# Patient Record
Sex: FEMALE | Race: WHITE | Employment: FULL TIME | URBAN - METROPOLITAN AREA
[De-identification: names, ages, dates, MRNs, and addresses within clinical notes are randomized per-mention and may not be internally consistent; named-entity substitution may affect disease eponyms.]

---

## 2022-01-14 ENCOUNTER — OFFICE VISIT (OUTPATIENT)
Dept: ORTHOPEDIC SURGERY | Age: 52
End: 2022-01-14
Payer: COMMERCIAL

## 2022-01-14 VITALS — WEIGHT: 125 LBS | BODY MASS INDEX: 20.83 KG/M2 | HEIGHT: 65 IN

## 2022-01-14 DIAGNOSIS — M77.11 LATERAL EPICONDYLITIS OF RIGHT ELBOW: Primary | ICD-10-CM

## 2022-01-14 PROCEDURE — 99214 OFFICE O/P EST MOD 30 MIN: CPT | Performed by: ORTHOPAEDIC SURGERY

## 2022-01-14 PROCEDURE — 20551 NJX 1 TENDON ORIGIN/INSJ: CPT | Performed by: ORTHOPAEDIC SURGERY

## 2022-01-14 RX ORDER — LIDOCAINE HYDROCHLORIDE 10 MG/ML
1 INJECTION INFILTRATION; PERINEURAL ONCE
Status: COMPLETED | OUTPATIENT
Start: 2022-01-14 | End: 2022-01-14

## 2022-01-14 RX ORDER — TOPIRAMATE 50 MG/1
TABLET, FILM COATED ORAL
COMMUNITY
Start: 2021-10-29

## 2022-01-14 RX ORDER — OMEPRAZOLE 40 MG/1
CAPSULE, DELAYED RELEASE ORAL
COMMUNITY
Start: 2021-11-10

## 2022-01-14 RX ORDER — BUPIVACAINE HYDROCHLORIDE 2.5 MG/ML
1 INJECTION, SOLUTION INFILTRATION; PERINEURAL ONCE
Status: COMPLETED | OUTPATIENT
Start: 2022-01-14 | End: 2022-01-14

## 2022-01-14 RX ORDER — METHYLPREDNISOLONE ACETATE 40 MG/ML
80 INJECTION, SUSPENSION INTRA-ARTICULAR; INTRALESIONAL; INTRAMUSCULAR; SOFT TISSUE ONCE
Status: COMPLETED | OUTPATIENT
Start: 2022-01-14 | End: 2022-01-14

## 2022-01-14 RX ORDER — ESCITALOPRAM OXALATE 10 MG/1
TABLET ORAL
COMMUNITY
Start: 2021-11-15

## 2022-01-14 RX ADMIN — BUPIVACAINE HYDROCHLORIDE 2.5 MG: 2.5 INJECTION, SOLUTION INFILTRATION; PERINEURAL at 10:16

## 2022-01-14 RX ADMIN — LIDOCAINE HYDROCHLORIDE 1 ML: 10 INJECTION INFILTRATION; PERINEURAL at 10:16

## 2022-01-14 RX ADMIN — METHYLPREDNISOLONE ACETATE 80 MG: 40 INJECTION, SUSPENSION INTRA-ARTICULAR; INTRALESIONAL; INTRAMUSCULAR; SOFT TISSUE at 10:16

## 2022-01-14 NOTE — PROGRESS NOTES
ASSESSMENT/PLAN:  Below is the assessment and plan developed based on review of pertinent history, physical exam, labs, studies, and medications. 1. Lateral epicondylitis of right elbow  In discussion with the patient, we considered the numerus possible diagnoses that could be contributing to their present symptoms. We also deliberated on the extensive management options that must be considered to treat their current condition. We reviewed their accessible prior medical records, diagnostic tests, and current health and employment information. We considered how these symptoms were affecting the patient´s activities of daily living as well as employment and fitness activities. The patient had various questions regarding the possible risks, benefits, complications, morbidity and mortality regarding their diagnosis and treatment options. The patients´ comorbidities were considered, and I advocated that they consider maximizing lifestyle modification through nutrition and exercise to aid in addressing their symptoms. Shared decision making yielded an understanding to move forward with conservation treatment preferences. The patient expressed understanding that if conservative management fails to alleviate the present symptoms they will return to office for re-evaluation and consideration of additional diagnostic tests and potential surgical options. In the interim, we have recommned ice, elevation and anti-inflammatory medications along with a physician directed home exercise program. We discussed the risks and common side effects of anti-inflamatory medications and instructed the patient to discontinue the medication and contact us if they experienced any side effects. The patient was encouraged to discuss the possible side effects with their family physician or pharmacist prior to initiating any new medications.      We discussed the possibility of injection of a steroid mixed with a local anesthetic to relieve pain and decrease inflammation of the right elbow. The risks of a steroid injection which include but are not limited to cartilage damage, death of nearby bone, joint infection, nerve damage, temporary facial flushing, temporary steroid flare of pain and inflammation in the joint, temporary increase in blood sugar, tendon weakening or rupture, thinning of nearby bone (osteoporosis), thinning of skin and soft tissue around the injection site, and whitening or lightening of the skin around the injection site were reviewed at length. We specifically advised that patients with diabetes talk to their primary care to ensure they are safe for a steroid injection due to the transient increase in blood sugar associated with the injection. We discussed the chance of increased bleeding and bruising if the patient is on blood thinners or certain dietary supplements that have a blood-thinning effect. We advised patients that have an active infection, history of allergic reactions to steroids or take medications that may prohibit them from receiving a steroid injection to talk to their primary care physician before receiving and injection. We also talked about limiting the number of injections because these potential side effects increase with larger doses and repeated use. After explaining the risks and benefits of the procedure and obtaining verbal informed consent from the patient, the proposed area for injection was confirmed with the patient. After all questions and concerns were addressed, the skin was prepped with alcohol to reduce the chances of infection. The skin was anesthetized with topical ethylene chloride spray and the mixture of two milliliters of Depo-Medrol (40mg/ml), one milliliter of Lidocaine and one milliliter of Marcaine was injected slowly into the lateral epicondyle of right elbow in a sterile fashion without difficulty. The needle was removed and disposed of in a sterile container.  The patient tolerated the injection well and a band-aid was placed on the skin. The patient was counseled on protecting the injection area, avoiding water submersion for 2 days, icing for pain relief and looking for signs and symptoms of infection. We requested that the patient contact us if any symptoms persist greater than 48 hours after the injection. We talked about the fact that multiple cortisone injections certainly has a side effect of skin atrophy. She understands this and is willing to proceed. Return in about 3 months (around 2022). SUBJECTIVE/OBJECTIVE:  Ysabel Ibarra (: 1970) is a 46 y.o. female, patient,here for evaluation of the Elbow Pain (right elbow)  . The patient is seen today for her right elbow. She was last seen on nd underwent a cortisone injection for tennis elbow. She returns today for follow-up. Physical Exam    Upon physical examination, the patient is well developed, well nourished, alert and oriented times three, with normal mood and affect and walks with a normal gait. Upon examination of the right elbow, the patient sits with normal posture. They are tender to palpation over the lateral epicondyle and extensor origin. The patient has full range of motion, but discomfort with resisted wrist extension and gripping. They have 5/5 strength, and are neurovascularly intact distally. There is no erythema, warmth or skin lesions present. On examination of the contralateral extremity, the patient is nontender to palpation and has excellent range of motion, stability and strength. Imaging:    I have reviewed the patient´s previous diagnostic tests in an effort to support the diagnosis and treatment options.     Allergies   Allergen Reactions    Tetracyclines Anaphylaxis       Current Outpatient Medications   Medication Sig    escitalopram oxalate (LEXAPRO) 10 mg tablet     omeprazole (PRILOSEC) 40 mg capsule     topiramate (TOPAMAX) 50 mg tablet No current facility-administered medications for this visit. Past Medical History:   Diagnosis Date    Cancer [facility])        Past Surgical History:   Procedure Laterality Date    HX KNEE ARTHROSCOPY         History reviewed. No pertinent family history. Social History     Socioeconomic History    Marital status:      Spouse name: Not on file    Number of children: Not on file    Years of education: Not on file    Highest education level: Not on file   Occupational History    Not on file   Tobacco Use    Smoking status: Current Every Day Smoker    Smokeless tobacco: Never Used   Vaping Use    Vaping Use: Never used   Substance and Sexual Activity    Alcohol use: Yes    Drug use: Never    Sexual activity: Not on file   Other Topics Concern    Not on file   Social History Narrative    Not on file     Social Determinants of Health     Financial Resource Strain:     Difficulty of Paying Living Expenses: Not on file   Food Insecurity:     Worried About Running Out of Food in the Last Year: Not on file    [place] of Food in the Last Year: Not on file   Transportation Needs:     Lack of Transportation (Medical): Not on file    Lack of Transportation (Non-Medical):  Not on file   Physical Activity:     Days of Exercise per Week: Not on file    Minutes of Exercise per Session: Not on file   Stress:     Feeling of Stress : Not on file   Social Connections:     Frequency of Communication with Friends and Family: Not on file    Frequency of Social Gatherings with Friends and Family: Not on file    Attends Bahai Services: Not on file    Active Member of Clubs or Organizations: Not on file    Attends Club or Organization Meetings: Not on file    Marital Status: Not on file   Intimate Partner Violence:     Fear of Current or Ex-Partner: Not on file    Emotionally Abused: Not on file    Physically Abused: Not on file    Sexually Abused: Not on file   Housing Stability:     Unable to Pay for Housing in the Last Year: Not on file    Number of Places Lived in the Last Year: Not on file    Unstable Housing in the Last Year: Not on file       Review of Systems    No flowsheet data found. Vitals:  Ht 5' 5\" (1.651 m)   Wt 125 lb (56.7 kg)   BMI 20.80 kg/m²    Body mass index is 20.8 kg/m². An electronic signature was used to authenticate this note.   -- Corrina Mock MD

## 2022-04-24 NOTE — PROGRESS NOTES
ASSESSMENT/PLAN:  Below is the assessment and plan developed based on review of pertinent history, physical exam, labs, studies, and medications. 1. Lateral epicondylitis of right elbow  -     RI DRAIN/INJECT INTERMEDIATE JOINT/BURSA      Return in about 3 months (around 7/25/2022). In discussion with the patient, we considered the numerus possible diagnoses that could be contributing to their present symptoms. We also deliberated on the extensive management options that must be considered to treat their current condition. We reviewed their accessible prior medical records, diagnostic tests, and current health and employment information. We considered how these symptoms were affecting the patient´s activities of daily living as well as employment and fitness activities. The patient had various questions regarding the possible risks, benefits, complications, morbidity and mortality regarding their diagnosis and treatment options. The patients´ comorbidities were considered, and I advocated that they consider maximizing lifestyle modification through nutrition and exercise to aid in addressing their symptoms. Shared decision making yielded an understanding to move forward with conservation treatment preferences. The patient expressed understanding that if conservative management fails to alleviate the present symptoms they will return to office for re-evaluation and consideration of additional diagnostic tests and potential surgical options. In the interim, we have recommended ice, elevation, and take prescription anti-inflammatory medications along with a physician directed home exercise program. We discussed the risks and common side effects of anti-inflammatory medications and instructed the patient to discontinue the medication and contact us if they experienced any side effects.  The patient was encouraged to discuss the possible side effects with their family physician or pharmacist prior to initiating any new medications. We discussed the fact that many of the recommended treatment options presented are significantly limited by the patient´s social determinants of health. We also reviewed the circumstances surrounding the environment that they live and work which affect a wide range of health risk. We considered the limited access to appropriate educational resources regarding proper nutrition and exercise as well as the economic and social support necessary to maintain health and wellbeing. We discussed the possibility of injection of a steroid mixed with a local anesthetic to relieve pain and decrease inflammation of the right elbow. The risks of a steroid injection which include but are not limited to cartilage damage, death of nearby bone, joint infection, nerve damage, temporary facial flushing, temporary steroid flare of pain and inflammation in the joint, temporary increase in blood sugar, tendon weakening or rupture, thinning of nearby bone (osteoporosis), thinning of skin and soft tissue around the injection site, and whitening or lightening of the skin around the injection site were reviewed at length. We specifically advised that patients with diabetes talk to their primary care to ensure they are safe for a steroid injection due to the transient increase in blood sugar associated with the injection. We discussed the chance of increased bleeding and bruising if the patient is on blood thinners or certain dietary supplements that have a blood-thinning effect. We advised patients that have an active infection, history of allergic reactions to steroids or take medications that may prohibit them from receiving a steroid injection to talk to their primary care physician before receiving and injection. We also talked about limiting the number of injections because these potential side effects increase with larger doses and repeated use.     After explaining the risks and benefits of the procedure and obtaining verbal informed consent from the patient, the proposed area for injection was confirmed with the patient. After all questions and concerns were addressed, the skin was prepped with alcohol to reduce the chances of infection. The skin was anesthetized with topical ethylene chloride spray and a mixture of two milliliters of Depo-Medrol (40mg/ml), one milliliter of Lidocaine and one milliliter of Marcaine was injected slowly into the lateral epicondyle of right elbow in a sterile fashion without difficulty. The needle was removed and disposed of in a sterile container. The patient tolerated the injection well and a band-aid was placed on the skin. The patient was counseled on protecting the injection area, avoiding water submersion for 2 days, icing for pain relief and looking for signs and symptoms of infection. We requested that the patient contact us if any symptoms persist greater than 48 hours after the injection. We a long discussion regarding the fact that I was concerned that she would need more than just injections. We talked for the possibility of an MRI if she continued to have discomfort despite injections. SUBJECTIVE/OBJECTIVE:  Carlos Monzon (: 1970) is a 46 y.o. female, patient,here for evaluation of the Elbow Pain (right elbow)  . The patient returns today for follow-up of her right elbow. She has known lateral epicondylitis of the right elbow. She has had multiple injections over the years with intermittent relief. She is continue to wear her brace. 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 She denies any numbness or tingling erythema or warmth. She is continue to ice and elevate. Physical Exam    Upon physical examination, the patient is well developed, well nourished, alert and oriented times three, with normal mood and affect and walks with a normal gait. Upon examination of the right elbow, the patient sits with normal posture.  They are tender to palpation over the lateral epicondyle and extensor origin. The patient has full range of motion, but discomfort with resisted wrist extension and gripping. They have 5/5 strength, and are neurovascularly intact distally. There is no erythema, warmth or skin lesions present. On examination of the contralateral extremity, the patient is nontender to palpation and has excellent range of motion, stability and strength. Imaging:    I have reviewed the patient´s previous diagnostic tests in an effort to support the diagnosis and treatment options. Allergies   Allergen Reactions    Tetracyclines Anaphylaxis       Current Outpatient Medications   Medication Sig    escitalopram oxalate (LEXAPRO) 10 mg tablet     omeprazole (PRILOSEC) 40 mg capsule     topiramate (TOPAMAX) 50 mg tablet      Current Facility-Administered Medications   Medication    bupivacaine (PF) (MARCAINE) 0.5 % (5 mg/mL) injection 10 mg    triamcinolone acetonide (KENALOG-40) 40 mg/mL injection 40 mg       Past Medical History:   Diagnosis Date    Cancer [facility])        Past Surgical History:   Procedure Laterality Date    HX KNEE ARTHROSCOPY         History reviewed. No pertinent family history. Social History     Socioeconomic History    Marital status:      Spouse name: Not on file    Number of children: Not on file    Years of education: Not on file    Highest education level: Not on file   Occupational History    Not on file   Tobacco Use    Smoking status: Current Every Day Smoker    Smokeless tobacco: Never Used   Vaping Use    Vaping Use: Never used   Substance and Sexual Activity    Alcohol use:  Yes    Drug use: Never    Sexual activity: Not on file   Other Topics Concern    Not on file   Social History Narrative    Not on file     Social Determinants of Health     Financial Resource Strain:     Difficulty of Paying Living Expenses: Not on file   Food Insecurity:     Worried About Running Out of Food in the Last Year: Not on file    Ran Out of Food in the Last Year: Not on file   Transportation Needs:     Lack of Transportation (Medical): Not on file    Lack of Transportation (Non-Medical): Not on file   Physical Activity:     Days of Exercise per Week: Not on file    Minutes of Exercise per Session: Not on file   Stress:     Feeling of Stress : Not on file   Social Connections:     Frequency of Communication with Friends and Family: Not on file    Frequency of Social Gatherings with Friends and Family: Not on file    Attends Muslim Services: Not on file    Active Member of [address] [place] [address] [employer] or Organizations: Not on file    Attends Club or Organization Meetings: Not on file    Marital Status: Not on file   Intimate Partner Violence:     Fear of Current or Ex-Partner: Not on file    Emotionally Abused: Not on file    Physically Abused: Not on file    Sexually Abused: Not on file   Housing Stability:     Unable to Pay for Housing in the Last Year: Not on file    Number of [place] in the Last Year: Not on file    Unstable Housing in the Last Year: Not on file       Review of Systems    No flowsheet data found. Vitals:  Ht 5' 5\" (1.651 m)   Wt 125 lb (56.7 kg)   BMI 20.80 kg/m²    Body mass index is 20.8 kg/m². An electronic signature was used to authenticate this note.   -- Sugar Whtiley MD

## 2022-04-25 ENCOUNTER — OFFICE VISIT (OUTPATIENT)
Dept: ORTHOPEDIC SURGERY | Age: 52
End: 2022-04-25
Payer: COMMERCIAL

## 2022-04-25 VITALS — HEIGHT: 65 IN | BODY MASS INDEX: 20.83 KG/M2 | WEIGHT: 125 LBS

## 2022-04-25 DIAGNOSIS — M77.11 LATERAL EPICONDYLITIS OF RIGHT ELBOW: Primary | ICD-10-CM

## 2022-04-25 PROCEDURE — 20551 NJX 1 TENDON ORIGIN/INSJ: CPT | Performed by: ORTHOPAEDIC SURGERY

## 2022-04-25 RX ORDER — TRIAMCINOLONE ACETONIDE 40 MG/ML
40 INJECTION, SUSPENSION INTRA-ARTICULAR; INTRAMUSCULAR ONCE
Status: COMPLETED | OUTPATIENT
Start: 2022-04-25 | End: 2022-04-25

## 2022-04-25 RX ORDER — BUPIVACAINE HYDROCHLORIDE 5 MG/ML
2 INJECTION, SOLUTION EPIDURAL; INTRACAUDAL ONCE
Status: COMPLETED | OUTPATIENT
Start: 2022-04-25 | End: 2022-04-25

## 2022-04-25 RX ADMIN — BUPIVACAINE HYDROCHLORIDE 10 MG: 5 INJECTION, SOLUTION EPIDURAL; INTRACAUDAL at 15:52

## 2022-04-25 RX ADMIN — TRIAMCINOLONE ACETONIDE 40 MG: 40 INJECTION, SUSPENSION INTRA-ARTICULAR; INTRAMUSCULAR at 15:52

## 2022-08-02 ENCOUNTER — TELEPHONE (OUTPATIENT)
Dept: ORTHOPEDIC SURGERY | Age: 52
End: 2022-08-02

## 2022-08-02 DIAGNOSIS — M77.11 LATERAL EPICONDYLITIS OF RIGHT ELBOW: Primary | ICD-10-CM

## 2022-08-02 NOTE — TELEPHONE ENCOUNTER
Patient phoned office stating she can feel that is is getting close to time for another injection but dr. Carlos Jerry did not really want to do another injection. I have discussed this with Dr. Carlos Jerry and he recommends getting a MRI and then follow up for results. Patient expressed understanding, screening questions were asked an patient was informed our MRI dept will call her to schedule.

## 2022-08-18 NOTE — PROGRESS NOTES
ASSESSMENT/PLAN:  Below is the assessment and plan developed based on review of pertinent history, physical exam, labs, studies, and medications. 1. Lateral epicondylitis of right elbow  -     ID DRAIN/INJECT INTERMEDIATE JOINT/BURSA      We discussed the treatment options for the patient´s diagnosis, which included: living with the extremity as it is, organized exercises, medicines, injections, and surgical options. Utilizing shared treatment decision-making; we also discussed the nature and purpose of the treatment options along with the expected risks and benefits. The patient has expressed a desire to proceed with surgery, and I think that is a reasonable option. I educated the patient regarding the inherent and unavoidable risks which include, but are not limited to anesthesia, infection, damage to nerves and blood vessels, blood loss, blood clots, and even death were discussed at length. We also talked about the possibility of not being able to return to prior activities or employment, the need for future surgery, and complex regional pain syndrome. The patient expressed understanding of these risks and has elected to proceed with surgery. Ample time was given for questions, of which many were addressed. We have discussed the surgical procedure as well as the realistic expectations regarding the risks, outcome and post-operative protocol. We will set this up when it is convenient for the patient. We have instructed them to contact us if there are any questions or concerns between now and their date of surgery. We talked about the procedure as well as postoperative protocol in detail. She is can look at her calendar and call us to schedule right elbow open lateral epicondylar release. She understands the time in a sling as well as lengthy rehabilitation for healing. She understands if she was returned to work she would be wearing a wrist brace postoperatively.     SUBJECTIVE/OBJECTIVE:  Lakisha Eller (: 1970) is a 46 y.o. female, patient,here for evaluation of the Elbow Pain (right elbow)  . The patient returns today for follow-up of her right elbow. She has known lateral epicondylitis of the right elbow. She has had multiple injections over the years with intermittent relief. She is continue to wear her brace. 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 She denies any numbness or tingling erythema or warmth. She is continue to ice and elevate. She reports the injection started to wear off. She reports she is very frustrated with the multiple conservative options that have only worked temporarily. She has had an MRI in the interim. Physical Exam    Upon physical examination, the patient is well developed, well nourished, alert and oriented times three, with normal mood and affect and walks with a normal gait. Upon examination of the right elbow, the patient sits with normal posture. They are tender to palpation over the lateral epicondyle and extensor origin. The patient has full range of motion, but discomfort with resisted wrist extension and gripping. They have 5/5 strength, and are neurovascularly intact distally. There is no erythema, warmth or skin lesions present. On examination of the contralateral extremity, the patient is nontender to palpation and has excellent range of motion, stability and strength. Imaging:    I independently reviewed the images and report. MRI ELBOW RT WO CONT  Narrative: EXAM: MRI ELBOW RT WO CONT    INDICATION: Dx: Lateral epicondylitis of right elbow [M77.11 (ICD-10-CM)]    COMPARISON: None    TECHNIQUE: Axial and coronal T1 and T2 fat-sat; sagittal T2 fat-sat and  gradient-echo MRI of the right elbow. CONTRAST: None. FINDINGS: Study degraded by motion. Bone marrow: Large marrow contusion in the lateral humeral epicondyle. No  discrete fracture line. No malalignment. Joint fluid: None.     Biceps, triceps, and brachialis tendons: intact. Common extensor and flexor tendons: High grade partial tear of the common  extensor tendon, with extensive peritendinitis    Muscles: Feathery edema signal within the proximal brachioradialis muscle with  extensive perifascial fluid signal, compatible with moderate grade strain. Ulnar collateral ligament and radial collateral ligamentous complex: Contour  irregularity of the radial collateral ligament superiorly (series 4, image 16;  series 6, images 11-12), may reflect partial tear. Ulnar collateral ligament is  intact. Gabino Salter Ulnar nerve: Within normal limits. Articular cartilage: intact. No osteochondral lesion. Soft tissue mass: None. Impression: 1. Study degraded by motion. 2.  High-grade partial tear of the common extensor tendon with peritendinitis. Brachioradialis moderate grade muscle strain. 3.  Large marrow contusion in the lateral humeral epicondyle. 4.  Possible partial tear of the radial collateral ligament. Allergies   Allergen Reactions    Tetracyclines Anaphylaxis       Current Outpatient Medications   Medication Sig    escitalopram oxalate (LEXAPRO) 10 mg tablet     omeprazole (PRILOSEC) 40 mg capsule     topiramate (TOPAMAX) 50 mg tablet      Current Facility-Administered Medications   Medication    bupivacaine (PF) (MARCAINE) 0.5 % (5 mg/mL) injection 10 mg    triamcinolone acetonide (KENALOG-40) 40 mg/mL injection 40 mg       Past Medical History:   Diagnosis Date    Cancer [facility])        Past Surgical History:   Procedure Laterality Date    HX KNEE ARTHROSCOPY         History reviewed. No pertinent family history.     Social History     Socioeconomic History    Marital status:      Spouse name: Not on file    Number of children: Not on file    Years of education: Not on file    Highest education level: Not on file   Occupational History    Not on file   Tobacco Use    Smoking status: Current Every Day Smoker    Smokeless tobacco: Never Used   Vaping Use    Vaping Use: Never used   Substance and Sexual Activity    Alcohol use: Yes    Drug use: Never    Sexual activity: Not on file   Other Topics Concern    Not on file   Social History Narrative    Not on file     Social Determinants of Health     Financial Resource Strain:     Difficulty of Paying Living Expenses: Not on file   Food Insecurity:     Worried About [address] in the Last Year: Not on file    Ran Out of Food in the Last Year: Not on file   Transportation Needs:     Lack of Transportation (Medical): Not on file    Lack of Transportation (Non-Medical): Not on file   Physical Activity:     Days of Exercise per Week: Not on file    Minutes of Exercise per Session: Not on file   Stress:     Feeling of Stress : Not on file   Social Connections:     Frequency of Communication with Friends and Family: Not on file    Frequency of Social Gatherings with Friends and Family: Not on file    Attends [employer] Services: Not on file    Active Member of Clubs or Organizations: Not on file    Attends Club or Organization Meetings: Not on file    Marital Status: Not on file   Intimate Partner Violence:     Fear of Current or Ex-Partner: Not on file    Emotionally Abused: Not on file    Physically Abused: Not on file    Sexually Abused: Not on file   Housing Stability:     Unable to Pay for Housing in the Last Year: Not on file    Number of [place] in the Last Year: Not on file    Unstable Housing in the Last Year: Not on file       Review of Systems    No flowsheet data found. Vitals:  Ht 5' 5\" (1.651 m)   Wt 125 lb (56.7 kg)   BMI 20.80 kg/m²    Body mass index is 20.8 kg/m². An electronic signature was used to authenticate this note.   -- Tino More MD

## 2022-08-19 ENCOUNTER — OFFICE VISIT (OUTPATIENT)
Dept: ORTHOPEDIC SURGERY | Age: 52
End: 2022-08-19
Payer: COMMERCIAL

## 2022-08-19 VITALS — HEIGHT: 65 IN | WEIGHT: 125 LBS | BODY MASS INDEX: 20.83 KG/M2

## 2022-08-19 DIAGNOSIS — M77.11 LATERAL EPICONDYLITIS OF RIGHT ELBOW: Primary | ICD-10-CM

## 2022-08-19 PROCEDURE — 99214 OFFICE O/P EST MOD 30 MIN: CPT | Performed by: ORTHOPAEDIC SURGERY

## 2022-08-19 RX ORDER — ERGOCALCIFEROL 1.25 MG/1
CAPSULE ORAL
COMMUNITY
Start: 2022-08-03

## 2022-09-07 DIAGNOSIS — M77.11 LATERAL EPICONDYLITIS OF RIGHT ELBOW: Primary | ICD-10-CM

## 2022-09-07 RX ORDER — OXYCODONE AND ACETAMINOPHEN 5; 325 MG/1; MG/1
1 TABLET ORAL
Qty: 42 TABLET | Refills: 0 | Status: SHIPPED | OUTPATIENT
Start: 2022-09-07 | End: 2022-09-14

## 2022-09-15 ENCOUNTER — DOCUMENTATION ONLY (OUTPATIENT)
Dept: ORTHOPEDIC SURGERY | Age: 52
End: 2022-09-15

## 2022-09-16 ENCOUNTER — OFFICE VISIT (OUTPATIENT)
Dept: ORTHOPEDIC SURGERY | Age: 52
End: 2022-09-16
Payer: COMMERCIAL

## 2022-09-16 VITALS — HEIGHT: 65 IN | BODY MASS INDEX: 20.83 KG/M2 | WEIGHT: 125 LBS

## 2022-09-16 DIAGNOSIS — M77.11 LATERAL EPICONDYLITIS OF RIGHT ELBOW: Primary | ICD-10-CM

## 2022-09-16 PROCEDURE — 99024 POSTOP FOLLOW-UP VISIT: CPT | Performed by: ORTHOPAEDIC SURGERY

## 2022-09-16 NOTE — PROGRESS NOTES
ASSESSMENT/PLAN:  Below is the assessment and plan developed based on review of pertinent history, physical exam, labs, studies, and medications. 1. Lateral epicondylitis of right elbow      No follow-ups on file. After long discussion regarding options we decided to continue to ice and elevate as well as take some anti-inflammatory medications. We will start a home exercise program.  We talked about the fact that she most likely needed to wear sling during the day in an effort to keep her activity at a minimum. I will see her back in 3 weeks time to evaluate her progress. SUBJECTIVE/OBJECTIVE:  Emma Tomlinson (: 1970) is a 46 y.o. female, patient,here for evaluation of the No chief complaint on file. .    Patient returns today for follow-up of her right elbow. She underwent right elbow open lateral epicondylar release on 2022. She has been in her sling. She has been icing elevating. She denies any numbness tingling erythema or warmth. Physical Exam    Examination the right elbow reveals her incisions are clean dry and intact. There is no drainage erythema or warmth. She does have some soft tissue swelling. There is no erythema or warmth. She is neurovascular intact distally. Allergies   Allergen Reactions    Tetracyclines Anaphylaxis       Current Outpatient Medications   Medication Sig    ergocalciferol (ERGOCALCIFEROL) 1,250 mcg (50,000 unit) capsule TAKE 1 CAPSULE BY MOUTH ONCE A WEEK FOR 90 DAYS    escitalopram oxalate (LEXAPRO) 10 mg tablet     omeprazole (PRILOSEC) 40 mg capsule     topiramate (TOPAMAX) 50 mg tablet      No current facility-administered medications for this visit. Past Medical History:   Diagnosis Date    Cancer [facility])        Past Surgical History:   Procedure Laterality Date    HX KNEE ARTHROSCOPY         No family history on file.     Social History     Socioeconomic History    Marital status:      Spouse name: Not on file    Number of children: Not on file    Years of education: Not on file    Highest education level: Not on file   Occupational History    Not on file   Tobacco Use    Smoking status: Every Day    Smokeless tobacco: Never   Vaping Use    Vaping Use: Never used   Substance and Sexual Activity    Alcohol use: Yes    Drug use: Never    Sexual activity: Not on file   Other Topics Concern    Not on file   Social History Narrative    Not on file     Social Determinants of Health     Financial Resource Strain: Not on file   Food Insecurity: Not on file   Transportation Needs: Not on file   Physical Activity: Not on file   Stress: Not on file   Social Connections: Not on file   Intimate Partner Violence: Not on file   Housing Stability: Not on file       Review of Systems    No flowsheet data found. Vitals: There were no vitals taken for this visit. There is no height or weight on file to calculate BMI. An electronic signature was used to authenticate this note.   -- Jose A Layne MD

## 2022-10-04 ENCOUNTER — DOCUMENTATION ONLY (OUTPATIENT)
Dept: ORTHOPEDIC SURGERY | Age: 52
End: 2022-10-04

## 2022-10-04 NOTE — PROGRESS NOTES
Guardian  [address] signed by provider and faxed - along with 09/16/22 office notes- on 09/30/22

## 2022-10-05 NOTE — PROGRESS NOTES
ASSESSMENT/PLAN:  Below is the assessment and plan developed based on review of pertinent history, physical exam, labs, studies, and medications. 1. Lateral epicondylitis of right elbow      We will progress her out of the sling at this point. We will continue to work on her full elbow extension as well as wrist extension. We will start some scar massage. I will see her back in 4 weeks time to evaluate her progress. If she is continuing well, we will consider some light strengthening in her next visit. SUBJECTIVE/OBJECTIVE:  Margret Payan (: 1970) is a 46 y.o. female, patient,here for evaluation of the No chief complaint on file. .    Patient returns today for follow-up of her right elbow. She underwent right elbow open lateral epicondylar release on 2022. She has been in her sling. She has been icing elevating. She denies any numbness tingling erythema or warmth. Physical Exam    Examination the right elbow reveals her incisions are clean dry and intact. There is no drainage erythema or warmth. She does have some soft tissue swelling. There is no erythema or warmth. She is neurovascular intact distally. Allergies   Allergen Reactions    Tetracyclines Anaphylaxis       Current Outpatient Medications   Medication Sig    ergocalciferol (ERGOCALCIFEROL) 1,250 mcg (50,000 unit) capsule TAKE 1 CAPSULE BY MOUTH ONCE A WEEK FOR 90 DAYS    escitalopram oxalate (LEXAPRO) 10 mg tablet     omeprazole (PRILOSEC) 40 mg capsule     topiramate (TOPAMAX) 50 mg tablet      No current facility-administered medications for this visit. Past Medical History:   Diagnosis Date    Cancer [facility])        Past Surgical History:   Procedure Laterality Date    HX KNEE ARTHROSCOPY         No family history on file.     Social History     Socioeconomic History    Marital status:      Spouse name: Not on file    Number of children: Not on file    Years of education: Not on file    Highest education level: Not on file   Occupational History    Not on file   Tobacco Use    Smoking status: Every Day    Smokeless tobacco: Never   Vaping Use    Vaping Use: Never used   Substance and Sexual Activity    Alcohol use: Yes    Drug use: Never    Sexual activity: Not on file   Other Topics Concern    Not on file   Social History Narrative    Not on file     Social Determinants of Health     Financial Resource Strain: Not on file   Food Insecurity: Not on file   Transportation Needs: Not on file   Physical Activity: Not on file   Stress: Not on file   Social Connections: Not on file   Intimate Partner Violence: Not on file   Housing Stability: Not on file       Review of Systems    No flowsheet data found. Vitals: There were no vitals taken for this visit. There is no height or weight on file to calculate BMI. An electronic signature was used to authenticate this note.   -- Leeroy Closs, MD

## 2022-10-07 ENCOUNTER — OFFICE VISIT (OUTPATIENT)
Dept: ORTHOPEDIC SURGERY | Age: 52
End: 2022-10-07
Payer: COMMERCIAL

## 2022-10-07 VITALS — BODY MASS INDEX: 20.83 KG/M2 | HEIGHT: 65 IN | WEIGHT: 125 LBS

## 2022-10-07 DIAGNOSIS — M77.11 LATERAL EPICONDYLITIS OF RIGHT ELBOW: Primary | ICD-10-CM

## 2022-10-07 PROCEDURE — 99024 POSTOP FOLLOW-UP VISIT: CPT | Performed by: ORTHOPAEDIC SURGERY

## 2022-10-11 ENCOUNTER — DOCUMENTATION ONLY (OUTPATIENT)
Dept: ORTHOPEDIC SURGERY | Age: 52
End: 2022-10-11

## 2022-11-03 NOTE — PROGRESS NOTES
ASSESSMENT/PLAN:  Below is the assessment and plan developed based on review of pertinent history, physical exam, labs, studies, and medications. 1. Lateral epicondylitis of right elbow      We will progress her out of the sling. She will continue to focus on motion. We will start some scar massage. We will focus on prior stretching for her wrist.  I will see her back in 1 month's time to evaluate her progress. We talked about getting a physical therapist involved if she continued to have difficulty regaining her extension. SUBJECTIVE/OBJECTIVE:  Teri Torres (: 1970) is a 46 y.o. female, patient,here for evaluation of the No chief complaint on file. .    Patient returns today for follow-up of her right elbow. She underwent right elbow open lateral epicondylar release on 2022. She has been in her sling. She has been icing elevating. She denies any numbness tingling erythema or warmth. Physical Exam    Examination the right elbow reveals her incisions are clean dry and intact. There is no drainage erythema or warmth. She does have some soft tissue swelling. There is no erythema or warmth. She is neurovascular intact distally. Allergies   Allergen Reactions    Tetracyclines Anaphylaxis       Current Outpatient Medications   Medication Sig    ergocalciferol (ERGOCALCIFEROL) 1,250 mcg (50,000 unit) capsule TAKE 1 CAPSULE BY MOUTH ONCE A WEEK FOR 90 DAYS    escitalopram oxalate (LEXAPRO) 10 mg tablet     omeprazole (PRILOSEC) 40 mg capsule     topiramate (TOPAMAX) 50 mg tablet      No current facility-administered medications for this visit. Past Medical History:   Diagnosis Date    Cancer [facility])        Past Surgical History:   Procedure Laterality Date    HX KNEE ARTHROSCOPY         No family history on file.     Social History     Socioeconomic History    Marital status:      Spouse name: Not on file    Number of children: Not on file    Years of education: Not on file    Highest education level: Not on file   Occupational History    Not on file   Tobacco Use    Smoking status: Every Day    Smokeless tobacco: Never   Vaping Use    Vaping Use: Never used   Substance and Sexual Activity    Alcohol use: Yes    Drug use: Never    Sexual activity: Not on file   Other Topics Concern    Not on file   Social History Narrative    Not on file     Social Determinants of Health     Financial Resource Strain: Not on file   Food Insecurity: Not on file   Transportation Needs: Not on file   Physical Activity: Not on file   Stress: Not on file   Social Connections: Not on file   Intimate Partner Violence: Not on file   Housing Stability: Not on file       Review of Systems    No flowsheet data found. Vitals: There were no vitals taken for this visit. There is no height or weight on file to calculate BMI. An electronic signature was used to authenticate this note.   -- Brandin Forde MD

## 2022-11-04 ENCOUNTER — OFFICE VISIT (OUTPATIENT)
Dept: ORTHOPEDIC SURGERY | Age: 52
End: 2022-11-04
Payer: COMMERCIAL

## 2022-11-04 VITALS — HEIGHT: 65 IN | WEIGHT: 125 LBS | BODY MASS INDEX: 20.83 KG/M2

## 2022-11-04 DIAGNOSIS — M77.11 LATERAL EPICONDYLITIS OF RIGHT ELBOW: Primary | ICD-10-CM

## 2022-11-04 PROCEDURE — 99024 POSTOP FOLLOW-UP VISIT: CPT | Performed by: ORTHOPAEDIC SURGERY

## 2022-11-07 ENCOUNTER — DOCUMENTATION ONLY (OUTPATIENT)
Dept: ORTHOPEDIC SURGERY | Age: 52
End: 2022-11-07

## 2022-12-02 ENCOUNTER — OFFICE VISIT (OUTPATIENT)
Dept: ORTHOPEDIC SURGERY | Age: 52
End: 2022-12-02
Payer: COMMERCIAL

## 2022-12-02 VITALS — BODY MASS INDEX: 20.83 KG/M2 | WEIGHT: 125 LBS | HEIGHT: 65 IN

## 2022-12-02 DIAGNOSIS — M77.11 LATERAL EPICONDYLITIS OF RIGHT ELBOW: Primary | ICD-10-CM

## 2022-12-02 PROCEDURE — 99024 POSTOP FOLLOW-UP VISIT: CPT | Performed by: ORTHOPAEDIC SURGERY

## 2022-12-02 NOTE — PROGRESS NOTES
ASSESSMENT/PLAN:  Below is the assessment and plan developed based on review of pertinent history, physical exam, labs, studies, and medications. 1. Lateral epicondylitis of right elbow      We will progress her to a home exercise program.  She will return to work on Monday. I will see her back on an as-needed basis. SUBJECTIVE/OBJECTIVE:  Zonia Lopez (: 1970) is a 46 y.o. female, patient,here for evaluation of the No chief complaint on file. .    Patient returns today for follow-up of her right elbow. She underwent right elbow open lateral epicondylar release on 2022. She has been back to activities of daily living and anxious to return to work. Physical Exam    Examination the right elbow reveals that her incisions well-healed. She is nontender to palpation. There is no swelling or bruising. She has full range of motion of the elbow. She is neuro vastly intact distally. Allergies   Allergen Reactions    Tetracyclines Anaphylaxis       Current Outpatient Medications   Medication Sig    ergocalciferol (ERGOCALCIFEROL) 1,250 mcg (50,000 unit) capsule TAKE 1 CAPSULE BY MOUTH ONCE A WEEK FOR 90 DAYS    escitalopram oxalate (LEXAPRO) 10 mg tablet     omeprazole (PRILOSEC) 40 mg capsule     topiramate (TOPAMAX) 50 mg tablet      No current facility-administered medications for this visit. Past Medical History:   Diagnosis Date    Cancer [facility])        Past Surgical History:   Procedure Laterality Date    HX KNEE ARTHROSCOPY         No family history on file.     Social History     Socioeconomic History    Marital status:      Spouse name: Not on file    Number of children: Not on file    Years of education: Not on file    Highest education level: Not on file   Occupational History    Not on file   Tobacco Use    Smoking status: Every Day    Smokeless tobacco: Never   Vaping Use    Vaping Use: Never used   Substance and Sexual Activity    Alcohol use: Yes    Drug use: Never Sexual activity: Not on file   Other Topics Concern    Not on file   Social History Narrative    Not on file     Social Determinants of Health     Financial Resource Strain: Not on file   Food Insecurity: Not on file   Transportation Needs: Not on file   Physical Activity: Not on file   Stress: Not on file   Social Connections: Not on file   Intimate Partner Violence: Not on file   Housing Stability: Not on file       Review of Systems    No flowsheet data found. Vitals: There were no vitals taken for this visit. There is no height or weight on file to calculate BMI. An electronic signature was used to authenticate this note.   -- Cristian Zafar MD

## 2022-12-06 ENCOUNTER — DOCUMENTATION ONLY (OUTPATIENT)
Dept: ORTHOPEDIC SURGERY | Age: 52
End: 2022-12-06